# Patient Record
Sex: FEMALE | Race: WHITE
[De-identification: names, ages, dates, MRNs, and addresses within clinical notes are randomized per-mention and may not be internally consistent; named-entity substitution may affect disease eponyms.]

---

## 2018-02-03 ENCOUNTER — HOSPITAL ENCOUNTER (EMERGENCY)
Dept: HOSPITAL 25 - ED | Age: 38
Discharge: HOME | End: 2018-02-03
Payer: COMMERCIAL

## 2018-02-03 DIAGNOSIS — Z72.0: ICD-10-CM

## 2018-02-03 DIAGNOSIS — J11.1: Primary | ICD-10-CM

## 2018-02-03 PROCEDURE — 99282 EMERGENCY DEPT VISIT SF MDM: CPT

## 2018-02-03 PROCEDURE — 87502 INFLUENZA DNA AMP PROBE: CPT

## 2018-02-03 NOTE — ED
Jorge CAMARILLO Nikita, scribed for Elijah Brizuela MD on 02/03/18 at 0734 .





Influenza-Like Illness





- HPI Summary


HPI Summary: 


This patient is a 37 year old F BIBA presenting to ED with a chief complaint of 

flu symptoms since 4 days ago. The patient rates the pain 5/10 in severity. 

Symptoms aggravated by nothing. Symptoms alleviated by nothing (pt took 

Ibuprofen 600 mg at 0230). Patient reports cough, rhinorrhea, fever, sore throat

, and body aches.





- History of Current Complaint


Chief Complaint: EDFluSymptoms


Time Seen by Provider: 02/03/18 07:31


Hx Obtained From: Patient


Onset/Duration: Sudden Onset, Lasting Days, Still Present


Severity: Moderate


Associated Signs & Symptoms: Fever, Cough, Sore Throat





- Allergy/Home Medications


Allergies/Adverse Reactions: 


 Allergies











Allergy/AdvReac Type Severity Reaction Status Date / Time


 


No Known Allergies Allergy   Verified 02/03/18 06:01














PMH/Surg Hx/FS Hx/Imm Hx


Endocrine/Hematology History: 


   Denies: Hx Diabetes, Hx Thyroid Disease


Cardiovascular History: 


   Denies: Hx Hypertension


Respiratory History: 


   Denies: Hx Asthma, Hx Chronic Obstructive Pulmonary Disease (COPD)


GI History: 


   Denies: Hx Ulcer





- Immunization History


Date of Tetanus Vaccine: unk


Date of Influenza Vaccine: none


Infectious Disease History: No


Infectious Disease History: 


   Denies: Hx Hepatitis, Hx Human Immunodeficiency Virus (HIV), Traveled 

Outside the [place] in Last 30 Days





- Family History


Known Family History: 


   Negative: Cardiac Disease, Hypertension, Diabetes





- Social History


Alcohol Use: Rare


Substance Use Type: Reports: None


Smoking Status (MU): Light Every Day Tobacco Smoker





Review of Systems


Positive: Fever


Positive: Sore Throat, Other - rhinnorhea


Positive: Cough


Positive: Other - body aches


All Other Systems Reviewed And Are Negative: Yes





Physical Exam





- Summary


Physical Exam Summary: 


VITAL SIGNS: Reviewed.


GENERAL: ~Patient is a well-developed and nourished FEMALE who is lying 

comfortable in the stretcher. ~Patient is not in any acute respiratory distress.


HEAD AND FACE: No signs of trauma. ~No ecchymosis, hematomas or skull 

depressions. No sinus tenderness.


EYES: PERRLA, EOMI x 2, No injected conjunctiva, no nystagmus.


EARS: Hearing grossly intact. Ear canals and tympanic membranes are within 

normal limits.


MOUTH: Oropharynx within normal limits.


NECK: Supple, trachea is midline, no adenopathy, no JVD, no carotid bruit, no c-

spine tenderness, neck with full ROM.


CHEST: Symmetric, no tenderness at palpation


LUNGS: Clear to auscultation bilaterally. No wheezing or crackles.


CVS: Regular rate and rhythm, S1 and S2 present, no murmurs or gallops 

appreciated.


ABDOMEN: Soft, non-tender. No signs of distention. No rebound no guarding, and 

no masses palpated. Bowel sounds are normal.


EXTREMITIES: FROM in all major joints, no edema, no cyanosis or clubbing.


NEURO: Alert and oriented x 3. No acute neurological deficits. Speech is normal 

and follows commands.


SKIN: Dry and warm


Triage Information Reviewed: Yes


Vital Signs On Initial Exam: 


 Initial Vitals











Temp Pulse Resp BP Pulse Ox


 


 98.3 F   105   18   105/61   96 


 


 02/03/18 05:04  02/03/18 05:04  02/03/18 05:04  02/03/18 05:04  02/03/18 05:04











Vital Signs Reviewed: Yes





Diagnostics





- Vital Signs


 Vital Signs











  Temp Pulse Resp BP Pulse Ox


 


 02/03/18 07:32  99.6 F    


 


 02/03/18 07:30   89    97


 


 02/03/18 05:04  98.3 F  105  18  105/61  96














- Laboratory


Lab Results: 


 Lab Results











  02/03/18 Range/Units





  05:15 


 


Influenza A (Rapid)  Negative  (Negative)  


 


Influenza B (Rapid)  Positive H  (Negative)  











Lab Statement: Any lab studies that have been ordered have been reviewed, and 

results considered in the medical decision making process.





Flu Symptom Course/Dx





- Course


Assessment/Plan: This patient is a 37 year old F BIBA presenting to ED with a 

chief complaint of flu symptoms since 4 days ago. Influenza B was positive. The 

pt was given Tamiflu. The pt is hemodynamically stable, alert and oriented x3. 

She is eating and drinking without N/V. She will be D/C home with follow up 

with PCP.





- Diagnoses


Differential Diagnosis/HQI/PQRI: Positive: Bronchitis, Influenza, Upper 

Respiratory Infection


Provider Diagnoses: 


 Influenza








Discharge





- Discharge Plan


Condition: Stable


Disposition: HOME


Prescriptions: 


Oseltamivir CAP* [Tamiflu CAP*] 75 mg PO BID #10 cap


Patient Education Materials:  Influenza (ED)


Referrals: 


Levy WESTBROOK,Satnam LY [Primary Care Provider] -  (Follow up with your PCP in 1-2 

days.)


Additional Instructions: 


RETURN TO THE ED FOR ANY NEW OR WORSENING SYMPTOMS.





The documentation as recorded by the Jorge powell Nikita accurately reflects the 

service I personally performed and the decisions made by me, Elijah Brizuela MD.

## 2018-07-11 ENCOUNTER — HOSPITAL ENCOUNTER (EMERGENCY)
Dept: HOSPITAL 25 - UCEAST | Age: 38
Discharge: HOME | End: 2018-07-11
Payer: COMMERCIAL

## 2018-07-11 VITALS — DIASTOLIC BLOOD PRESSURE: 76 MMHG | SYSTOLIC BLOOD PRESSURE: 124 MMHG

## 2018-07-11 DIAGNOSIS — F17.210: ICD-10-CM

## 2018-07-11 DIAGNOSIS — N39.0: Primary | ICD-10-CM

## 2018-07-11 PROCEDURE — 81003 URINALYSIS AUTO W/O SCOPE: CPT

## 2018-07-11 PROCEDURE — 87077 CULTURE AEROBIC IDENTIFY: CPT

## 2018-07-11 PROCEDURE — 87086 URINE CULTURE/COLONY COUNT: CPT

## 2018-07-11 PROCEDURE — 87186 SC STD MICRODIL/AGAR DIL: CPT

## 2018-07-11 PROCEDURE — G0463 HOSPITAL OUTPT CLINIC VISIT: HCPCS

## 2018-07-11 PROCEDURE — 99212 OFFICE O/P EST SF 10 MIN: CPT

## 2018-07-11 NOTE — UC
Complaint Female HPI





- HPI Summary


HPI Summary: 


Pt is a 36 y/o F presents to  c/o frequent urination. Describes urination as 

burning and had hematuria this morning. 3 days ago she had a tooth pulled and 

was prescribed amoxicillin for infection and it was at this time she thought 

she might have a yeast infection. She was also prescribed another medication 

that she cannot recall the name of that caused her arms and face to go numb and 

tingle. PMHx of UTI. Denies abnormal vaginal discharge, back pain, abdominal 

pain, and diarrhea. No chance of pregnancy due to PSHx of hysterectomy. 





- History Of Current Complaint


Chief Complaint: UCGU


Stated Complaint: URINARY COMPLAINT


Time Seen by Provider: 07/11/18 11:35


Hx Obtained From: Patient


Hx Last Menstrual Period: 10/17


Pregnant?: No


Onset/Duration: Lasting Days, Still Present


Severity Currently: Moderate


Pain Intensity: 5


Pain Scale Used: 0-10 Numeric


Character: Burning


Aggravating Factor(s): Urination


Associated Signs And Symptoms: Negative: Back Pain, Vaginal Discharge





- Allergies/Home Medications


Allergies/Adverse Reactions: 


 Allergies











Allergy/AdvReac Type Severity Reaction Status Date / Time


 


No Known Allergies Allergy   Verified 07/11/18 11:40














PMH/Surg Hx/FS Hx/Imm Hx


Respiratory History: Asthma


GI/ History: Other - UTI


Other GI/ History: .





- Surgical History


Surgical History: Yes


Surgery Procedure, Year, and Place: partial hysterectomy 10/17





- Family History


Known Family History: 


   Negative: Cardiac Disease, Hypertension, Diabetes





- Social History


Alcohol Use: Rare


Substance Use Type: None


Smoking Status (MU): Light Every Day Tobacco Smoker


Type: Cigarettes





Review of Systems


Gastrointestinal: Abdominal Pain - NEGATIVE, Diarrhea - NEGATIVE


Genitourinary: Dysuria, Hematuria, Frequency, Vaginal/Penile Discharge - 

NEGATIVE


Musculoskeletal: Other: - NEGATIVE: back pain


All Other Systems Reviewed And Are Negative: Yes





Physical Exam





- Summary


Physical Exam Summary: 





General: well-appearing, no pain distress


Skin: warm, color reflects adequate perfusion, dry


Head: normal


Eyes: EOMI, LI


ENT: normal


Neck: supple, nontender


Respiratory: CTA, breath sounds present


Cardiovascular: RRR


Abdomen: mild tenderness to palpation in suprapubic region, soft, 


Bowel: present


Musculoskeletal: normal, strength/ROM intact


Neurological: sensory/motor intact, A&O x3


Psychological: affect/mood appropriate


Triage Information Reviewed: Yes


Vital Signs: 


 Initial Vital Signs











Temp  98.9 F   07/11/18 11:35


 


Pulse  88   07/11/18 11:35


 


Resp  18   07/11/18 11:35


 


BP  124/76   07/11/18 11:35


 


Pulse Ox  99   07/11/18 11:35











Vital Signs Reviewed: Yes





 Complaint Female Dx





- Course


Course Of Treatment: F/U PMD; RECHECK SOONER IF WORSE.





- Differential Dx/Diagnosis


Provider Diagnoses: UTI





Discharge





- Sign-Out/Discharge


Documenting (check all that apply): Patient Departure





- Discharge Plan


Condition: Stable


Disposition: HOME


Prescriptions: 


Fluconazole 150 MG (NF) [Diflucan 150 mg (NF)] 150 mg PO ONCE PRN #1 tab


 PRN Reason: Pain


Sulfamethox/Trimethoprim DS* [Bactrim /160 TAB*] 1 tab PO BID #20 tab


Patient Education Materials:  Urinary Tract Infection in Women (ED)


Referrals: 


Levy WESTBROOK,Satnam LY [Primary Care Provider] - 


Additional Instructions: 


FOLLOW UP WITH YOUR DOCTOR IF NOT COMPLETELY IMPROVED.


GET RECHECKED FOR ANY WORSENING OF YOUR CONDITION; PAIN, FEVER, YOU FEEL ILL OR 

QUESTIONS OR CONCERNS.





- Billing Disposition and Condition


Condition: STABLE


Disposition: Home